# Patient Record
Sex: FEMALE | Race: WHITE | NOT HISPANIC OR LATINO | URBAN - METROPOLITAN AREA
[De-identification: names, ages, dates, MRNs, and addresses within clinical notes are randomized per-mention and may not be internally consistent; named-entity substitution may affect disease eponyms.]

---

## 2018-09-22 ENCOUNTER — INPATIENT (INPATIENT)
Facility: HOSPITAL | Age: 58
LOS: 2 days | Discharge: HOME | End: 2018-09-25
Attending: OBSTETRICS & GYNECOLOGY | Admitting: OBSTETRICS & GYNECOLOGY
Payer: MEDICARE

## 2018-09-22 VITALS
OXYGEN SATURATION: 100 % | DIASTOLIC BLOOD PRESSURE: 76 MMHG | TEMPERATURE: 99 F | HEART RATE: 115 BPM | RESPIRATION RATE: 16 BRPM | SYSTOLIC BLOOD PRESSURE: 134 MMHG

## 2018-09-22 DIAGNOSIS — S31.41XA LACERATION WITHOUT FOREIGN BODY OF VAGINA AND VULVA, INITIAL ENCOUNTER: ICD-10-CM

## 2018-09-22 DIAGNOSIS — Z90.710 ACQUIRED ABSENCE OF BOTH CERVIX AND UTERUS: Chronic | ICD-10-CM

## 2018-09-22 LAB
ALBUMIN SERPL ELPH-MCNC: 3.8 G/DL — SIGNIFICANT CHANGE UP (ref 3.3–5)
ALP SERPL-CCNC: 93 U/L — SIGNIFICANT CHANGE UP (ref 40–120)
ALT FLD-CCNC: 8 U/L — SIGNIFICANT CHANGE UP (ref 4–33)
APTT BLD: 44.1 SEC — HIGH (ref 27.5–37.4)
AST SERPL-CCNC: 18 U/L — SIGNIFICANT CHANGE UP (ref 4–32)
BASE EXCESS BLDV CALC-SCNC: 2.1 MMOL/L — SIGNIFICANT CHANGE UP
BASOPHILS # BLD AUTO: 0.05 K/UL — SIGNIFICANT CHANGE UP (ref 0–0.2)
BASOPHILS NFR BLD AUTO: 0.3 % — SIGNIFICANT CHANGE UP (ref 0–2)
BILIRUB SERPL-MCNC: 0.3 MG/DL — SIGNIFICANT CHANGE UP (ref 0.2–1.2)
BLD GP AB SCN SERPL QL: NEGATIVE — SIGNIFICANT CHANGE UP
BLOOD GAS VENOUS - CREATININE: 0.58 MG/DL — SIGNIFICANT CHANGE UP (ref 0.5–1.3)
BUN SERPL-MCNC: 10 MG/DL — SIGNIFICANT CHANGE UP (ref 7–23)
CALCIUM SERPL-MCNC: 8.9 MG/DL — SIGNIFICANT CHANGE UP (ref 8.4–10.5)
CHLORIDE BLDV-SCNC: 104 MMOL/L — SIGNIFICANT CHANGE UP (ref 96–108)
CHLORIDE SERPL-SCNC: 102 MMOL/L — SIGNIFICANT CHANGE UP (ref 98–107)
CO2 SERPL-SCNC: 23 MMOL/L — SIGNIFICANT CHANGE UP (ref 22–31)
CREAT SERPL-MCNC: 0.69 MG/DL — SIGNIFICANT CHANGE UP (ref 0.5–1.3)
EOSINOPHIL # BLD AUTO: 0 K/UL — SIGNIFICANT CHANGE UP (ref 0–0.5)
EOSINOPHIL NFR BLD AUTO: 0 % — SIGNIFICANT CHANGE UP (ref 0–6)
GAS PNL BLDV: 138 MMOL/L — SIGNIFICANT CHANGE UP (ref 136–146)
GLUCOSE BLDV-MCNC: 107 — HIGH (ref 70–99)
GLUCOSE SERPL-MCNC: 103 MG/DL — HIGH (ref 70–99)
HCO3 BLDV-SCNC: 26 MMOL/L — SIGNIFICANT CHANGE UP (ref 20–27)
HCT VFR BLD CALC: 30.5 % — LOW (ref 34.5–45)
HCT VFR BLD CALC: 34.3 % — LOW (ref 34.5–45)
HCT VFR BLDV CALC: 37.4 % — SIGNIFICANT CHANGE UP (ref 34.5–45)
HGB BLD-MCNC: 10.2 G/DL — LOW (ref 11.5–15.5)
HGB BLD-MCNC: 11.4 G/DL — LOW (ref 11.5–15.5)
HGB BLDV-MCNC: 12.2 G/DL — SIGNIFICANT CHANGE UP (ref 11.5–15.5)
IMM GRANULOCYTES # BLD AUTO: 0.11 # — SIGNIFICANT CHANGE UP
IMM GRANULOCYTES NFR BLD AUTO: 0.7 % — SIGNIFICANT CHANGE UP (ref 0–1.5)
INR BLD: 3.03 — HIGH (ref 0.88–1.17)
LACTATE BLDV-MCNC: 1.2 MMOL/L — SIGNIFICANT CHANGE UP (ref 0.5–2)
LYMPHOCYTES # BLD AUTO: 0.71 K/UL — LOW (ref 1–3.3)
LYMPHOCYTES # BLD AUTO: 4.4 % — LOW (ref 13–44)
MCHC RBC-ENTMCNC: 33.2 % — SIGNIFICANT CHANGE UP (ref 32–36)
MCHC RBC-ENTMCNC: 33.2 PG — SIGNIFICANT CHANGE UP (ref 27–34)
MCHC RBC-ENTMCNC: 33.2 PG — SIGNIFICANT CHANGE UP (ref 27–34)
MCHC RBC-ENTMCNC: 33.4 % — SIGNIFICANT CHANGE UP (ref 32–36)
MCV RBC AUTO: 100 FL — SIGNIFICANT CHANGE UP (ref 80–100)
MCV RBC AUTO: 99.3 FL — SIGNIFICANT CHANGE UP (ref 80–100)
MONOCYTES # BLD AUTO: 0.91 K/UL — HIGH (ref 0–0.9)
MONOCYTES NFR BLD AUTO: 5.6 % — SIGNIFICANT CHANGE UP (ref 2–14)
NEUTROPHILS # BLD AUTO: 14.49 K/UL — HIGH (ref 1.8–7.4)
NEUTROPHILS NFR BLD AUTO: 89 % — HIGH (ref 43–77)
NRBC # FLD: 0 — SIGNIFICANT CHANGE UP
NRBC # FLD: 0 — SIGNIFICANT CHANGE UP
PCO2 BLDV: 43 MMHG — SIGNIFICANT CHANGE UP (ref 41–51)
PH BLDV: 7.41 PH — SIGNIFICANT CHANGE UP (ref 7.32–7.43)
PLATELET # BLD AUTO: 310 K/UL — SIGNIFICANT CHANGE UP (ref 150–400)
PLATELET # BLD AUTO: 320 K/UL — SIGNIFICANT CHANGE UP (ref 150–400)
PMV BLD: 10.5 FL — SIGNIFICANT CHANGE UP (ref 7–13)
PMV BLD: 9.9 FL — SIGNIFICANT CHANGE UP (ref 7–13)
PO2 BLDV: 35 MMHG — SIGNIFICANT CHANGE UP (ref 35–40)
POTASSIUM BLDV-SCNC: 3.2 MMOL/L — LOW (ref 3.4–4.5)
POTASSIUM SERPL-MCNC: 3.4 MMOL/L — LOW (ref 3.5–5.3)
POTASSIUM SERPL-SCNC: 3.4 MMOL/L — LOW (ref 3.5–5.3)
PROT SERPL-MCNC: 6.7 G/DL — SIGNIFICANT CHANGE UP (ref 6–8.3)
PROTHROM AB SERPL-ACNC: 35.7 SEC — HIGH (ref 9.8–13.1)
RBC # BLD: 3.07 M/UL — LOW (ref 3.8–5.2)
RBC # BLD: 3.43 M/UL — LOW (ref 3.8–5.2)
RBC # FLD: 13.3 % — SIGNIFICANT CHANGE UP (ref 10.3–14.5)
RBC # FLD: 13.5 % — SIGNIFICANT CHANGE UP (ref 10.3–14.5)
RH IG SCN BLD-IMP: NEGATIVE — SIGNIFICANT CHANGE UP
SAO2 % BLDV: 65.2 % — SIGNIFICANT CHANGE UP (ref 60–85)
SODIUM SERPL-SCNC: 138 MMOL/L — SIGNIFICANT CHANGE UP (ref 135–145)
WBC # BLD: 16.27 K/UL — HIGH (ref 3.8–10.5)
WBC # BLD: 22.25 K/UL — HIGH (ref 3.8–10.5)
WBC # FLD AUTO: 16.27 K/UL — HIGH (ref 3.8–10.5)
WBC # FLD AUTO: 22.25 K/UL — HIGH (ref 3.8–10.5)

## 2018-09-22 RX ORDER — SODIUM CHLORIDE 9 MG/ML
2000 INJECTION INTRAMUSCULAR; INTRAVENOUS; SUBCUTANEOUS ONCE
Qty: 0 | Refills: 0 | Status: COMPLETED | OUTPATIENT
Start: 2018-09-22 | End: 2018-09-22

## 2018-09-22 RX ORDER — PHYTONADIONE (VIT K1) 5 MG
5 TABLET ORAL ONCE
Qty: 0 | Refills: 0 | Status: COMPLETED | OUTPATIENT
Start: 2018-09-22 | End: 2018-09-22

## 2018-09-22 RX ORDER — TRANEXAMIC ACID 100 MG/ML
1000 INJECTION, SOLUTION INTRAVENOUS ONCE
Qty: 0 | Refills: 0 | Status: COMPLETED | OUTPATIENT
Start: 2018-09-22 | End: 2018-09-22

## 2018-09-22 RX ORDER — SODIUM CHLORIDE 9 MG/ML
1000 INJECTION INTRAMUSCULAR; INTRAVENOUS; SUBCUTANEOUS ONCE
Qty: 0 | Refills: 0 | Status: COMPLETED | OUTPATIENT
Start: 2018-09-22 | End: 2018-09-22

## 2018-09-22 RX ORDER — PROTHROMBIN COMPLEX CONCENTRATE (HUMAN) 25.5; 16.5; 24; 22; 22; 26 [IU]/ML; [IU]/ML; [IU]/ML; [IU]/ML; [IU]/ML; [IU]/ML
1500 POWDER, FOR SOLUTION INTRAVENOUS ONCE
Qty: 0 | Refills: 0 | Status: COMPLETED | OUTPATIENT
Start: 2018-09-22 | End: 2018-09-22

## 2018-09-22 RX ADMIN — SODIUM CHLORIDE 1000 MILLILITER(S): 9 INJECTION INTRAMUSCULAR; INTRAVENOUS; SUBCUTANEOUS at 20:13

## 2018-09-22 RX ADMIN — SODIUM CHLORIDE 1000 MILLILITER(S): 9 INJECTION INTRAMUSCULAR; INTRAVENOUS; SUBCUTANEOUS at 17:30

## 2018-09-22 RX ADMIN — Medication 101 MILLIGRAM(S): at 21:30

## 2018-09-22 RX ADMIN — TRANEXAMIC ACID 220 MILLIGRAM(S): 100 INJECTION, SOLUTION INTRAVENOUS at 18:07

## 2018-09-22 RX ADMIN — PROTHROMBIN COMPLEX CONCENTRATE (HUMAN) 1500 INTERNATIONAL UNIT(S): 25.5; 16.5; 24; 22; 22; 26 POWDER, FOR SOLUTION INTRAVENOUS at 21:29

## 2018-09-22 RX ADMIN — SODIUM CHLORIDE 1000 MILLILITER(S): 9 INJECTION INTRAMUSCULAR; INTRAVENOUS; SUBCUTANEOUS at 23:51

## 2018-09-22 RX ADMIN — PROTHROMBIN COMPLEX CONCENTRATE (HUMAN) 400 INTERNATIONAL UNIT(S): 25.5; 16.5; 24; 22; 22; 26 POWDER, FOR SOLUTION INTRAVENOUS at 21:11

## 2018-09-22 RX ADMIN — TRANEXAMIC ACID 1000 MILLIGRAM(S): 100 INJECTION, SOLUTION INTRAVENOUS at 20:13

## 2018-09-22 NOTE — CONSULT NOTE ADULT - PROBLEM SELECTOR RECOMMENDATION 9
-fibrillar placed. Vaginal packing x1 placed.   -pt will need to demonstrate ability to void prior to d/c  -pt to return to the ED in 2 days to have packing removed  -Anticoagulation per ED care    Pt seen with Dr. Julius Smith, pgy2

## 2018-09-22 NOTE — ED PROVIDER NOTE - OBJECTIVE STATEMENT
58 year female with SLE, anti-phospholipid syndrome, Factor 5 Leiden, on coumadin, h/o oopherectomy/hysterectomy, presenting with 3 hour h/o vaginal bleeding. Patient was having sex with sexual partner today when she began to bleed heavy with clots. This was her first time having sexual intercourse in 10 years. Patient soaked through 3 or 4 pads at home then another 6 pads at the ED. First occurrence of symptoms. Denies any dyspaurenia, LOC, stool changes, hematuria, leg swelling, SOB, or CP. Has travel hx flying to [place] from [place].    For SLE on MTX, plaquinal, prednisone, azothiprine

## 2018-09-22 NOTE — ED ADULT NURSE NOTE - NSIMPLEMENTINTERV_GEN_ALL_ED
Implemented All Fall with Harm Risk Interventions:  Adrian to call system. Call bell, personal items and telephone within reach. Instruct patient to call for assistance. Room bathroom lighting operational. Non-slip footwear when patient is off stretcher. Physically safe environment: no spills, clutter or unnecessary equipment. Stretcher in lowest position, wheels locked, appropriate side rails in place. Provide visual cue, wrist band, yellow gown, etc. Monitor gait and stability. Monitor for mental status changes and reorient to person, place, and time. Review medications for side effects contributing to fall risk. Reinforce activity limits and safety measures with patient and family. Provide visual clues: red socks.

## 2018-09-22 NOTE — CONSULT NOTE ADULT - SUBJECTIVE AND OBJECTIVE BOX
R2 GYN ED CONSULT NOTE    SUBJECTIVE:    57yo  s/p TLH BS () pmh significant SLE, APLS on coumadin with vaginal bleeding after intercourse. Pt states that she had not had intercourse for many years. Today she started to have vaginal bleeding during intercourse.     She states that this was not a new partner. She declined STD screening. She states that it was voluntary intercourse.     Pt denies fever, chills, nausea, vomiting, diarrhea, headache, constipation, dizzyness, syncope, chest pain, palpitations, shortness of breath, dysuria, urgency, frequency. No abd vaginal discharge.     Pt is from [place] and is visiting a friend.       Primary OB/GYN:  OBH: c/s x2   GYNH: denies hx of fibroids, ov cysts, abnl paps, sti she had a TL Bilateral Salpingectomy for menorrhagia. ()   PMSH: s/p c/s x2, s/p lap cholecystectomy s/p ESTEPHANIE, BS   MEDS: Coumadin, mtx (monthly injections)   ALL: pencillin (rash)   SOCH: denies t/e/d; safe at home  FAMH: denies fam hx of breast/uterine/ovarian/colon cancer  ROS: negative except per hpi    OBJECTIVE:    VITAL SIGNS:  Vital Signs Last 24 Hrs  T(C): 37.3 (22 Sep 2018 19:51), Max: 37.3 (22 Sep 2018 19:51)  T(F): 99.1 (22 Sep 2018 19:51), Max: 99.1 (22 Sep 2018 19:51)  HR: 95 (22 Sep 2018 21:46) (92 - 115)  BP: 156/40 (22 Sep 2018 21:46) (108/50 - 160/91)  BP(mean): --  RR: 20 (22 Sep 2018 21:46) (16 - 20)  SpO2: 98% (22 Sep 2018 21:46) (98% - 100%)    CAPILLARY BLOOD GLUCOSE      PHYSICAL EXAM:  GEN: NAD, A&Ox3  CV: RRR, no m/g/r  LUNGS: CTAB  ABD: soft, NT, ND, +BS  SPECULUM: intact vaginal cuff. 3cm laceration in the posterior vagina, right side. Clots in vaginal vault. Slow bleeding from laceration.   EXT: WWP, no edema/TTP    LABS:                        10.2   22.25 )-----------( 310      ( 22 Sep 2018 20:30 )             30.5   baso x      eos x      imm gran x      lymph x      mono x      poly x                            11.4   16.27 )-----------( 320      ( 22 Sep 2018 16:50 )             34.3   baso 0.3    eos 0.0    imm gran 0.7    lymph 4.4    mono 5.6    poly 89.0           138  |  102  |  10  ----------------------------<  103<H>  3.4<L>   |  23  |  0.69    Ca    8.9      22 Sep 2018 16:50    TPro  6.7  /  Alb  3.8  /  TBili  0.3  /  DBili  x   /  AST  18  /  ALT  8   /  AlkPhos  93

## 2018-09-22 NOTE — ED ADULT TRIAGE NOTE - CHIEF COMPLAINT QUOTE
Pt c/o vag spotting w clots x this morning during sexual intercourse/denies abd pain, hysterectomy in 1985, on coumadin, hx of lupus. Pt c/o vag spotting w clots x this morning during sexual intercourse/denies abd pain, hysterectomy in 1985, on coumadin for factor 5 leiden, hx of lupus. Pt c/o vag bleeding w clots x this morning during sexual intercourse/denies abd pain, hysterectomy in 1985, on coumadin for factor 5 leiden, hx of lupus.

## 2018-09-22 NOTE — ED ADULT NURSE NOTE - CHIEF COMPLAINT QUOTE
Pt c/o vag bleeding w clots x this morning during sexual intercourse/denies abd pain, hysterectomy in 1985, on coumadin for factor 5 leiden, hx of lupus.

## 2018-09-22 NOTE — ED PROVIDER NOTE - MEDICAL DECISION MAKING DETAILS
John:  58 F, lupus, APL, Factor 5 Leiden (Coumadin), h/o hysterectomy, prednisone, p/w VB after intercourse (blood and clots). No dyspareunia. PE: Removed much clot and blood. No vaginal wall lac/polyp/tumor. Bleeding seems to be coming from deep anterior/superior wall, just to the upper left of where the cervix used to be (small trickle of blood there, adherent clot). Give TXA. Check Hb and INR.

## 2018-09-22 NOTE — ED ADULT NURSE NOTE - OBJECTIVE STATEMENT
patient alert ox3 came in c/o heavy vaginal bleeding started around 1pm today while having intercourse, states she is bleeding profusely with clots. h/o total hysterectomy in the past and is on coumadin. CM shows sinus tach. Md by bed side evaluating. labs done as ordered. awaiting results and re eval.

## 2018-09-22 NOTE — ED PROVIDER NOTE - ATTENDING CONTRIBUTION TO CARE
I performed a face-to-face evaluation of the patient and performed a history and physical examination. I agree with the history and physical examination.    John:  58 F, lupus, APL, Factor 5 Leiden (Coumadin), h/o hysterectomy, prednisone, p/w VB after intercourse (blood and clots). Doesn't have atrophic vaginitis. Hadn't had sex for 10 years. No dyspareunia. PE: Removed much clot and blood. No vaginal wall lac/polyp/tumor. Bleeding seems to be coming from deep anterior/superior wall, just to the upper left of where the cervix used to be (small trickle of blood there, adherent clot). Give TXA. Check Hb and INR.

## 2018-09-22 NOTE — ED PROVIDER NOTE - CHIEF COMPLAINT
The patient is a 58y Female complaining of The patient is a 58y Female complaining of vaginal bleeding

## 2018-09-23 DIAGNOSIS — N93.9 ABNORMAL UTERINE AND VAGINAL BLEEDING, UNSPECIFIED: ICD-10-CM

## 2018-09-23 DIAGNOSIS — Z90.49 ACQUIRED ABSENCE OF OTHER SPECIFIED PARTS OF DIGESTIVE TRACT: Chronic | ICD-10-CM

## 2018-09-23 LAB
ALBUMIN SERPL ELPH-MCNC: 3.4 G/DL — SIGNIFICANT CHANGE UP (ref 3.3–5)
ALP SERPL-CCNC: 74 U/L — SIGNIFICANT CHANGE UP (ref 40–120)
ALT FLD-CCNC: 8 U/L — SIGNIFICANT CHANGE UP (ref 4–33)
APTT BLD: 26.8 SEC — LOW (ref 27.5–37.4)
AST SERPL-CCNC: 13 U/L — SIGNIFICANT CHANGE UP (ref 4–32)
BASOPHILS # BLD AUTO: 0.03 K/UL — SIGNIFICANT CHANGE UP (ref 0–0.2)
BASOPHILS # BLD AUTO: 0.03 K/UL — SIGNIFICANT CHANGE UP (ref 0–0.2)
BASOPHILS NFR BLD AUTO: 0.2 % — SIGNIFICANT CHANGE UP (ref 0–2)
BASOPHILS NFR BLD AUTO: 0.2 % — SIGNIFICANT CHANGE UP (ref 0–2)
BILIRUB SERPL-MCNC: 0.5 MG/DL — SIGNIFICANT CHANGE UP (ref 0.2–1.2)
BUN SERPL-MCNC: 11 MG/DL — SIGNIFICANT CHANGE UP (ref 7–23)
CALCIUM SERPL-MCNC: 8.2 MG/DL — LOW (ref 8.4–10.5)
CHLORIDE SERPL-SCNC: 99 MMOL/L — SIGNIFICANT CHANGE UP (ref 98–107)
CO2 SERPL-SCNC: 22 MMOL/L — SIGNIFICANT CHANGE UP (ref 22–31)
CREAT SERPL-MCNC: 0.7 MG/DL — SIGNIFICANT CHANGE UP (ref 0.5–1.3)
EOSINOPHIL # BLD AUTO: 0 K/UL — SIGNIFICANT CHANGE UP (ref 0–0.5)
EOSINOPHIL # BLD AUTO: 0.01 K/UL — SIGNIFICANT CHANGE UP (ref 0–0.5)
EOSINOPHIL NFR BLD AUTO: 0 % — SIGNIFICANT CHANGE UP (ref 0–6)
EOSINOPHIL NFR BLD AUTO: 0.1 % — SIGNIFICANT CHANGE UP (ref 0–6)
GLUCOSE SERPL-MCNC: 149 MG/DL — HIGH (ref 70–99)
HCT VFR BLD CALC: 20.7 % — CRITICAL LOW (ref 34.5–45)
HCT VFR BLD CALC: 23.1 % — LOW (ref 34.5–45)
HCT VFR BLD CALC: 26.6 % — LOW (ref 34.5–45)
HCT VFR BLD CALC: 29.3 % — LOW (ref 34.5–45)
HGB BLD-MCNC: 6.9 G/DL — CRITICAL LOW (ref 11.5–15.5)
HGB BLD-MCNC: 7.8 G/DL — LOW (ref 11.5–15.5)
HGB BLD-MCNC: 8.7 G/DL — LOW (ref 11.5–15.5)
HGB BLD-MCNC: 9.8 G/DL — LOW (ref 11.5–15.5)
IMM GRANULOCYTES # BLD AUTO: 0.1 # — SIGNIFICANT CHANGE UP
IMM GRANULOCYTES # BLD AUTO: 0.14 # — SIGNIFICANT CHANGE UP
IMM GRANULOCYTES NFR BLD AUTO: 0.6 % — SIGNIFICANT CHANGE UP (ref 0–1.5)
IMM GRANULOCYTES NFR BLD AUTO: 1 % — SIGNIFICANT CHANGE UP (ref 0–1.5)
INR BLD: 1 — SIGNIFICANT CHANGE UP (ref 0.88–1.17)
INR BLD: 1.19 — HIGH (ref 0.88–1.17)
INR BLD: 1.3 — HIGH (ref 0.88–1.17)
LACTATE SERPL-SCNC: 2 MMOL/L — SIGNIFICANT CHANGE UP (ref 0.5–2)
LYMPHOCYTES # BLD AUTO: 1.67 K/UL — SIGNIFICANT CHANGE UP (ref 1–3.3)
LYMPHOCYTES # BLD AUTO: 10.7 % — LOW (ref 13–44)
LYMPHOCYTES # BLD AUTO: 2.8 K/UL — SIGNIFICANT CHANGE UP (ref 1–3.3)
LYMPHOCYTES # BLD AUTO: 20.6 % — SIGNIFICANT CHANGE UP (ref 13–44)
MCHC RBC-ENTMCNC: 31.8 PG — SIGNIFICANT CHANGE UP (ref 27–34)
MCHC RBC-ENTMCNC: 32.7 % — SIGNIFICANT CHANGE UP (ref 32–36)
MCHC RBC-ENTMCNC: 32.8 PG — SIGNIFICANT CHANGE UP (ref 27–34)
MCHC RBC-ENTMCNC: 33.3 % — SIGNIFICANT CHANGE UP (ref 32–36)
MCHC RBC-ENTMCNC: 33.4 % — SIGNIFICANT CHANGE UP (ref 32–36)
MCHC RBC-ENTMCNC: 33.8 % — SIGNIFICANT CHANGE UP (ref 32–36)
MCHC RBC-ENTMCNC: 34 PG — SIGNIFICANT CHANGE UP (ref 27–34)
MCHC RBC-ENTMCNC: 34.8 PG — HIGH (ref 27–34)
MCV RBC AUTO: 100.4 FL — HIGH (ref 80–100)
MCV RBC AUTO: 102 FL — HIGH (ref 80–100)
MCV RBC AUTO: 103.1 FL — HIGH (ref 80–100)
MCV RBC AUTO: 95.1 FL — SIGNIFICANT CHANGE UP (ref 80–100)
MONOCYTES # BLD AUTO: 1.1 K/UL — HIGH (ref 0–0.9)
MONOCYTES # BLD AUTO: 1.13 K/UL — HIGH (ref 0–0.9)
MONOCYTES NFR BLD AUTO: 7 % — SIGNIFICANT CHANGE UP (ref 2–14)
MONOCYTES NFR BLD AUTO: 8.3 % — SIGNIFICANT CHANGE UP (ref 2–14)
NEUTROPHILS # BLD AUTO: 12.74 K/UL — HIGH (ref 1.8–7.4)
NEUTROPHILS # BLD AUTO: 9.46 K/UL — HIGH (ref 1.8–7.4)
NEUTROPHILS NFR BLD AUTO: 69.8 % — SIGNIFICANT CHANGE UP (ref 43–77)
NEUTROPHILS NFR BLD AUTO: 81.5 % — HIGH (ref 43–77)
NRBC # FLD: 0 — SIGNIFICANT CHANGE UP
PLATELET # BLD AUTO: 213 K/UL — SIGNIFICANT CHANGE UP (ref 150–400)
PLATELET # BLD AUTO: 228 K/UL — SIGNIFICANT CHANGE UP (ref 150–400)
PLATELET # BLD AUTO: 243 K/UL — SIGNIFICANT CHANGE UP (ref 150–400)
PLATELET # BLD AUTO: 267 K/UL — SIGNIFICANT CHANGE UP (ref 150–400)
PMV BLD: 10.2 FL — SIGNIFICANT CHANGE UP (ref 7–13)
PMV BLD: 10.3 FL — SIGNIFICANT CHANGE UP (ref 7–13)
PMV BLD: 10.9 FL — SIGNIFICANT CHANGE UP (ref 7–13)
PMV BLD: 9.9 FL — SIGNIFICANT CHANGE UP (ref 7–13)
POTASSIUM SERPL-MCNC: 3.3 MMOL/L — LOW (ref 3.5–5.3)
POTASSIUM SERPL-SCNC: 3.3 MMOL/L — LOW (ref 3.5–5.3)
PROT SERPL-MCNC: 5.6 G/DL — LOW (ref 6–8.3)
PROTHROM AB SERPL-ACNC: 11.5 SEC — SIGNIFICANT CHANGE UP (ref 9.8–13.1)
PROTHROM AB SERPL-ACNC: 13.7 SEC — HIGH (ref 9.8–13.1)
PROTHROM AB SERPL-ACNC: 14.5 SEC — HIGH (ref 9.8–13.1)
RBC # BLD: 2.03 M/UL — LOW (ref 3.8–5.2)
RBC # BLD: 2.24 M/UL — LOW (ref 3.8–5.2)
RBC # BLD: 2.65 M/UL — LOW (ref 3.8–5.2)
RBC # BLD: 3.08 M/UL — LOW (ref 3.8–5.2)
RBC # FLD: 13.3 % — SIGNIFICANT CHANGE UP (ref 10.3–14.5)
RBC # FLD: 13.3 % — SIGNIFICANT CHANGE UP (ref 10.3–14.5)
RBC # FLD: 13.4 % — SIGNIFICANT CHANGE UP (ref 10.3–14.5)
RBC # FLD: 16.4 % — HIGH (ref 10.3–14.5)
RH IG SCN BLD-IMP: NEGATIVE — SIGNIFICANT CHANGE UP
SODIUM SERPL-SCNC: 134 MMOL/L — LOW (ref 135–145)
WBC # BLD: 12.81 K/UL — HIGH (ref 3.8–10.5)
WBC # BLD: 13.57 K/UL — HIGH (ref 3.8–10.5)
WBC # BLD: 15.64 K/UL — HIGH (ref 3.8–10.5)
WBC # BLD: 17.2 K/UL — HIGH (ref 3.8–10.5)
WBC # FLD AUTO: 12.81 K/UL — HIGH (ref 3.8–10.5)
WBC # FLD AUTO: 13.57 K/UL — HIGH (ref 3.8–10.5)
WBC # FLD AUTO: 15.64 K/UL — HIGH (ref 3.8–10.5)
WBC # FLD AUTO: 17.2 K/UL — HIGH (ref 3.8–10.5)

## 2018-09-23 PROCEDURE — 57200 REPAIR OF VAGINA: CPT | Mod: GC

## 2018-09-23 RX ORDER — SODIUM CHLORIDE 9 MG/ML
1000 INJECTION, SOLUTION INTRAVENOUS
Qty: 0 | Refills: 0 | Status: DISCONTINUED | OUTPATIENT
Start: 2018-09-23 | End: 2018-09-23

## 2018-09-23 RX ORDER — HEPARIN SODIUM 5000 [USP'U]/ML
INJECTION INTRAVENOUS; SUBCUTANEOUS
Qty: 25000 | Refills: 0 | Status: DISCONTINUED | OUTPATIENT
Start: 2018-09-23 | End: 2018-09-24

## 2018-09-23 RX ORDER — ONDANSETRON 8 MG/1
4 TABLET, FILM COATED ORAL ONCE
Qty: 0 | Refills: 0 | Status: DISCONTINUED | OUTPATIENT
Start: 2018-09-23 | End: 2018-09-23

## 2018-09-23 RX ORDER — LEVETIRACETAM 250 MG/1
750 TABLET, FILM COATED ORAL EVERY 12 HOURS
Qty: 0 | Refills: 0 | Status: DISCONTINUED | OUTPATIENT
Start: 2018-09-23 | End: 2018-09-23

## 2018-09-23 RX ORDER — AZATHIOPRINE 100 MG/1
50 TABLET ORAL DAILY
Qty: 0 | Refills: 0 | Status: DISCONTINUED | OUTPATIENT
Start: 2018-09-23 | End: 2018-09-25

## 2018-09-23 RX ORDER — HEPARIN SODIUM 5000 [USP'U]/ML
5500 INJECTION INTRAVENOUS; SUBCUTANEOUS EVERY 6 HOURS
Qty: 0 | Refills: 0 | Status: DISCONTINUED | OUTPATIENT
Start: 2018-09-23 | End: 2018-09-24

## 2018-09-23 RX ORDER — HEPARIN SODIUM 5000 [USP'U]/ML
2500 INJECTION INTRAVENOUS; SUBCUTANEOUS EVERY 6 HOURS
Qty: 0 | Refills: 0 | Status: DISCONTINUED | OUTPATIENT
Start: 2018-09-23 | End: 2018-09-24

## 2018-09-23 RX ORDER — FENTANYL CITRATE 50 UG/ML
25 INJECTION INTRAVENOUS
Qty: 0 | Refills: 0 | Status: DISCONTINUED | OUTPATIENT
Start: 2018-09-23 | End: 2018-09-23

## 2018-09-23 RX ORDER — IBUPROFEN 200 MG
600 TABLET ORAL EVERY 6 HOURS
Qty: 0 | Refills: 0 | Status: DISCONTINUED | OUTPATIENT
Start: 2018-09-23 | End: 2018-09-25

## 2018-09-23 RX ORDER — LEVETIRACETAM 250 MG/1
750 TABLET, FILM COATED ORAL
Qty: 0 | Refills: 0 | Status: DISCONTINUED | OUTPATIENT
Start: 2018-09-23 | End: 2018-09-25

## 2018-09-23 RX ORDER — ACETAMINOPHEN 500 MG
975 TABLET ORAL EVERY 6 HOURS
Qty: 0 | Refills: 0 | Status: DISCONTINUED | OUTPATIENT
Start: 2018-09-23 | End: 2018-09-25

## 2018-09-23 RX ORDER — FERROUS SULFATE 325(65) MG
325 TABLET ORAL DAILY
Qty: 0 | Refills: 0 | Status: DISCONTINUED | OUTPATIENT
Start: 2018-09-23 | End: 2018-09-25

## 2018-09-23 RX ORDER — VENLAFAXINE HCL 75 MG
75 CAPSULE, EXT RELEASE 24 HR ORAL EVERY 8 HOURS
Qty: 0 | Refills: 0 | Status: DISCONTINUED | OUTPATIENT
Start: 2018-09-23 | End: 2018-09-25

## 2018-09-23 RX ORDER — FOLIC ACID 0.8 MG
1 TABLET ORAL DAILY
Qty: 0 | Refills: 0 | Status: DISCONTINUED | OUTPATIENT
Start: 2018-09-23 | End: 2018-09-25

## 2018-09-23 RX ORDER — HYDROXYCHLOROQUINE SULFATE 200 MG
200 TABLET ORAL DAILY
Qty: 0 | Refills: 0 | Status: DISCONTINUED | OUTPATIENT
Start: 2018-09-23 | End: 2018-09-25

## 2018-09-23 RX ORDER — SODIUM CHLORIDE 9 MG/ML
1000 INJECTION, SOLUTION INTRAVENOUS
Qty: 0 | Refills: 0 | Status: DISCONTINUED | OUTPATIENT
Start: 2018-09-23 | End: 2018-09-25

## 2018-09-23 RX ORDER — OXYCODONE HYDROCHLORIDE 5 MG/1
5 TABLET ORAL ONCE
Qty: 0 | Refills: 0 | Status: DISCONTINUED | OUTPATIENT
Start: 2018-09-23 | End: 2018-09-23

## 2018-09-23 RX ORDER — PANTOPRAZOLE SODIUM 20 MG/1
40 TABLET, DELAYED RELEASE ORAL
Qty: 0 | Refills: 0 | Status: DISCONTINUED | OUTPATIENT
Start: 2018-09-23 | End: 2018-09-25

## 2018-09-23 RX ORDER — FLUTICASONE PROPIONATE 50 MCG
1 SPRAY, SUSPENSION NASAL
Qty: 0 | Refills: 0 | Status: DISCONTINUED | OUTPATIENT
Start: 2018-09-23 | End: 2018-09-25

## 2018-09-23 RX ADMIN — HEPARIN SODIUM 1200 UNIT(S)/HR: 5000 INJECTION INTRAVENOUS; SUBCUTANEOUS at 21:07

## 2018-09-23 RX ADMIN — Medication 1 SPRAY(S): at 18:09

## 2018-09-23 RX ADMIN — PANTOPRAZOLE SODIUM 40 MILLIGRAM(S): 20 TABLET, DELAYED RELEASE ORAL at 18:08

## 2018-09-23 RX ADMIN — Medication 1 MILLIGRAM(S): at 18:08

## 2018-09-23 RX ADMIN — Medication 325 MILLIGRAM(S): at 18:08

## 2018-09-23 RX ADMIN — LEVETIRACETAM 750 MILLIGRAM(S): 250 TABLET, FILM COATED ORAL at 23:04

## 2018-09-23 RX ADMIN — SODIUM CHLORIDE 125 MILLILITER(S): 9 INJECTION, SOLUTION INTRAVENOUS at 23:23

## 2018-09-23 RX ADMIN — SODIUM CHLORIDE 125 MILLILITER(S): 9 INJECTION, SOLUTION INTRAVENOUS at 10:13

## 2018-09-23 RX ADMIN — Medication 75 MILLIGRAM(S): at 18:07

## 2018-09-23 RX ADMIN — Medication 975 MILLIGRAM(S): at 08:45

## 2018-09-23 RX ADMIN — Medication 200 MILLIGRAM(S): at 18:07

## 2018-09-23 RX ADMIN — SODIUM CHLORIDE 75 MILLILITER(S): 9 INJECTION, SOLUTION INTRAVENOUS at 03:26

## 2018-09-23 RX ADMIN — Medication 975 MILLIGRAM(S): at 09:15

## 2018-09-23 NOTE — DISCHARGE NOTE ADULT - PLAN OF CARE
Recovery Regular diet as tolerated, regular activity as tolerated, no heavy lifting for first two weeks.  Nothing per vagina: no intercourse, tampons or douching for 6 weeks.  Call your provider if you experience fevers, chills, worsening abdominal pain, inability to urinate or worsening vaginal bleeding.  Follow up with your provider in 2 weeks.

## 2018-09-23 NOTE — H&P ADULT - PROBLEM SELECTOR PLAN 1
- please admit to Dr. Madrid  -Pt to be added to OR for urgent  repair of vaginal laceration   -pre-op labs including CBC  -2upRBC on hold  -Fluid resuscitation  D/w Dr. Julius Smith, pgy2

## 2018-09-23 NOTE — DISCHARGE NOTE ADULT - PATIENT PORTAL LINK FT
You can access the [facility] Portal, offered by [facility] [place], by registering with the following website: [email or web address]

## 2018-09-23 NOTE — DISCHARGE NOTE ADULT - INSTRUCTIONS
monitor for s/s of infection, monitor for s/s of infection, pus/drainage coming from incision site, pain unrelieved with pain medication, come to Emergency room and call physician.

## 2018-09-23 NOTE — DISCHARGE NOTE ADULT - CARE PROVIDER_API CALL
JOSÉ GYN,   [facility]  ambulatory care unit, 3rd floor  [phone]  Phone: (   )    -  Fax: (   )    -

## 2018-09-23 NOTE — H&P ADULT - NSHPLABSRESULTS_GEN_ALL_CORE
8.7                  x    | x    | x            x     >-----------< 267     ------------------------< x                     26.6                 x    | x    | x                                            Ca x     Mg x     Ph x
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
## 2018-09-23 NOTE — PROGRESS NOTE ADULT - ASSESSMENT
Vaginal laceration s/p repair with probable higher EBL than calculated, no active bleeding suspected  Will transfuse 2units PRBC and reevaluation  Anticipate D/c home after blood transfusions/reevaluation

## 2018-09-23 NOTE — PROGRESS NOTE ADULT - SUBJECTIVE AND OBJECTIVE BOX
OB Attg--late entry    Pt feels tired and weak when she's ambulating.  Denies VB, pain, CP, SOB.  Vital Signs Last 24 Hrs  T(C): 36.8 (23 Sep 2018 12:50), Max: 37.4 (23 Sep 2018 06:00)  T(F): 98.3 (23 Sep 2018 12:50), Max: 99.3 (23 Sep 2018 06:00)  HR: 91 (23 Sep 2018 12:50) (83 - 115)  BP: 124/48 (23 Sep 2018 12:50) (93/55 - 160/91)  BP(mean): 58 (23 Sep 2018 09:00) (58 - 58)  RR: 18 (23 Sep 2018 09:55) (16 - 23)  SpO2: 98% (23 Sep 2018 09:55) (95% - 100%)    Abd--nontender  Pelvic--no active bleeding, surgicel was felt in vagina--minimally stained  Rectovaginal--intact, no hematoma felt                          6.9    13.57 )-----------( 228      ( 23 Sep 2018 06:40 )             20.7

## 2018-09-23 NOTE — BRIEF OPERATIVE NOTE - OPERATION/FINDINGS
3cm right posterior vaginal laceration. Excellent hemostasis after suture. 3cm right apical vaginal laceration closed with 3-0 Monocryl - running locked stitch. Excellent hemostasis after suture.

## 2018-09-23 NOTE — DISCHARGE NOTE ADULT - HOSPITAL COURSE
Patient presented to the ED with vaginal bleeding from a vaginal laceration. She received K centra for reversal as she was on coumadin. She was taken to the OR for repair of vaginal laceration when pt became hypotensive in the ED. She had an uncomplicated surgery.   Please see operative note for details.  During postoperative course patient's vitals were stable, vaginal bleeding appropriate, and pain well controlled.  Post operation day one hematocrit was 23.1.  On day of discharge patient was ambulating, her pain controlled with oral medications, had adequate oral intake, and was voiding freely.  Discharge instructions and precautions were given.  Pt is visiting and is out of town. She states that she will come to the ED in a couple days for INR management. Alternatively she was given the clinic number to follow up in 2 weeks for a postoperative check. Patient presented to the ED with vaginal bleeding from a vaginal laceration. She received K centra for reversal as she was on coumadin. She was taken to the OR for repair of vaginal laceration when pt became hypotensive in the ED. She had an uncomplicated surgery.   Please see operative note for details.  During postoperative course patient's vitals were stable, vaginal bleeding appropriate, and pain well controlled.  Post operation day one hematocrit was 23.1.  On day of discharge patient was ambulating, her pain controlled with oral medications, had adequate oral intake, and was voiding freely.  Discharge instructions and precautions were given.  Pt is visiting and is out of town. Pt will follow with hematology for INR management. Alternatively she was given the clinic number to follow up in 2 weeks for a postoperative check. Patient presented to the ED with vaginal bleeding from a vaginal laceration. She received K centra for reversal as she was on coumadin. She was taken to the OR for repair of vaginal laceration when pt became hypotensive in the ED. She had an uncomplicated surgery.   Please see operative note for details.  During postoperative course patient's vitals were stable, vaginal bleeding appropriate, and pain well controlled.  Post operation day one hematocrit was 23.1.  On day of discharge patient was ambulating, her pain controlled with oral medications, had adequate oral intake, and was voiding freely.  Discharge instructions and precautions were given. Pt will follow with hematology for INR management. Patient has been given the clinic number to follow up in 2 weeks for a postoperative check as she is from out of town and has not yet established care. Patient presented to the ED with vaginal bleeding from a vaginal laceration. She received K centra for reversal as she was on coumadin. She was taken to the OR for repair of vaginal laceration when pt became hypotensive in the ED. She had an uncomplicated surgery.   Please see operative note for details.  During postoperative course patient's vitals were stable, vaginal bleeding appropriate, and pain well controlled.  Post operation day one hematocrit was 23.1.  On day of discharge patient was ambulating, her pain controlled with oral medications, had adequate oral intake, and was voiding freely.  Discharge instructions and precautions were given. Pt will follow with PMD for INR management and is being discharged on warfarin and Lovenox for bridging. Patient has been given the clinic number to follow up in 2 weeks for a postoperative check as she is from out of town and has not yet established care.

## 2018-09-23 NOTE — H&P ADULT - ASSESSMENT
57yo  s/p TLH BS () pmh significant SLE, APLS on coumadin with vaginal bleeding after intercourse. VB 2/2  3cm vaginal laceration. Intact vaginal cuff. Pt became hypotensive 80-90/40s after vaginal packing placement and soaked through packing after one hour.

## 2018-09-23 NOTE — DISCHARGE NOTE ADULT - PROVIDER TOKENS
FREE:[LAST:[[facility]],PHONE:[(   )    -],FAX:[(   )    -],ADDRESS:[[facility]  ambulatory care unit, 3rd floor  [phone]]]

## 2018-09-23 NOTE — DISCHARGE NOTE ADULT - CARE PLAN
Principal Discharge DX:	Vaginal laceration  Goal:	Recovery  Assessment and plan of treatment:	Regular diet as tolerated, regular activity as tolerated, no heavy lifting for first two weeks.  Nothing per vagina: no intercourse, tampons or douching for 6 weeks.  Call your provider if you experience fevers, chills, worsening abdominal pain, inability to urinate or worsening vaginal bleeding.  Follow up with your provider in 2 weeks.

## 2018-09-23 NOTE — DISCHARGE NOTE ADULT - MEDICATION SUMMARY - MEDICATIONS TO TAKE
I will START or STAY ON the medications listed below when I get home from the hospital:  None I will START or STAY ON the medications listed below when I get home from the hospital:    predniSONE 1 mg oral tablet  -- 1 tab(s) by mouth once a day  -- Indication: For Home med    warfarin 5 mg oral tablet  -- 1 tab(s) by mouth once a day  -- Indication: For Blood thinner    enoxaparin 100 mg/mL injectable solution  -- 100 milligram(s) subcutaneously once a day   -- It is very important that you take or use this exactly as directed.  Do not skip doses or discontinue unless directed by your doctor.    -- Indication: For Blood thinner to bridge to coumadin    levETIRAcetam 750 mg oral tablet  -- 1 tab(s) by mouth 2 times a day  -- Indication: For Home med    venlafaxine 75 mg oral tablet  -- 1 tab(s) by mouth every 8 hours  -- Indication: For Home med    hydroxychloroquine 200 mg oral tablet  -- 1 tab(s) by mouth once a day  -- Indication: For Home med    azaTHIOprine 50 mg oral tablet  -- 1 tab(s) by mouth once a day  -- Indication: For Home med    fluticasone 50 mcg/inh nasal spray  -- 1 spray(s) into nose 2 times a day  -- Indication: For Home med    pantoprazole 40 mg oral delayed release tablet  -- 1 tab(s) by mouth once a day (before a meal)  -- Indication: For Home med    folic acid 1 mg oral tablet  -- 1 tab(s) by mouth once a day  -- Indication: For Home med I will START or STAY ON the medications listed below when I get home from the hospital:    predniSONE 1 mg oral tablet  -- 1 tab(s) by mouth once a day  -- Indication: For Home med    warfarin 5 mg oral tablet  -- 1 tab(s) by mouth once a day  -- Indication: For Blood thinner    enoxaparin 80 mg/0.8 mL injectable solution  -- 70 milligram(s) subcutaneously 2 times a day  -- It is very important that you take or use this exactly as directed.  Do not skip doses or discontinue unless directed by your doctor.    -- Indication: For Blood thinner to bridge to coumadin    levETIRAcetam 750 mg oral tablet  -- 1 tab(s) by mouth 2 times a day  -- Indication: For Home med    venlafaxine 75 mg oral tablet  -- 1 tab(s) by mouth every 8 hours  -- Indication: For Home med    hydroxychloroquine 200 mg oral tablet  -- 1 tab(s) by mouth once a day  -- Indication: For Home med    azaTHIOprine 50 mg oral tablet  -- 1 tab(s) by mouth once a day  -- Indication: For Home med    fluticasone 50 mcg/inh nasal spray  -- 1 spray(s) into nose 2 times a day  -- Indication: For Home med    pantoprazole 40 mg oral delayed release tablet  -- 1 tab(s) by mouth once a day (before a meal)  -- Indication: For Home med    folic acid 1 mg oral tablet  -- 1 tab(s) by mouth once a day  -- Indication: For Home med I will START or STAY ON the medications listed below when I get home from the hospital:    predniSONE 1 mg oral tablet  -- 1 tab(s) by mouth once a day  -- Indication: For Home med    enoxaparin 80 mg/0.8 mL injectable solution  -- 70 milligram(s) subcutaneously 2 times a day  -- It is very important that you take or use this exactly as directed.  Do not skip doses or discontinue unless directed by your doctor.    -- Indication: For Blood thinner to bridge to coumadin    warfarin 7.5 mg oral tablet  -- 1 tab(s) by mouth once a day  -- Indication: For Blood thinner    levETIRAcetam 750 mg oral tablet  -- 1 tab(s) by mouth 2 times a day  -- Indication: For Home med    venlafaxine 75 mg oral tablet  -- 1 tab(s) by mouth every 8 hours  -- Indication: For Home med    hydroxychloroquine 200 mg oral tablet  -- 1 tab(s) by mouth once a day  -- Indication: For Home med    azaTHIOprine 50 mg oral tablet  -- 1 tab(s) by mouth once a day  -- Indication: For Home med    fluticasone 50 mcg/inh nasal spray  -- 1 spray(s) into nose 2 times a day  -- Indication: For Home med    pantoprazole 40 mg oral delayed release tablet  -- 1 tab(s) by mouth once a day (before a meal)  -- Indication: For Home med    folic acid 1 mg oral tablet  -- 1 tab(s) by mouth once a day  -- Indication: For Home med I will START or STAY ON the medications listed below when I get home from the hospital:    predniSONE 1 mg oral tablet  -- 1 tab(s) by mouth once a day  -- Indication: For Home med    warfarin 7.5 mg oral tablet  -- 1 tab(s) by mouth once a day  -- Indication: For Blood thinner    enoxaparin 80 mg/0.8 mL injectable solution  -- 70 milligram(s) subcutaneously 2 times a day  -- It is very important that you take or use this exactly as directed.  Do not skip doses or discontinue unless directed by your doctor.    -- Indication: For Blood thinner to bridge to coumadin    levETIRAcetam 750 mg oral tablet  -- 1 tab(s) by mouth 2 times a day  -- Indication: For Home med    venlafaxine 75 mg oral tablet  -- 1 tab(s) by mouth every 8 hours  -- Indication: For Home med    hydroxychloroquine 200 mg oral tablet  -- 1 tab(s) by mouth once a day  -- Indication: For Home med    azaTHIOprine 50 mg oral tablet  -- 1 tab(s) by mouth once a day  -- Indication: For Home med    fluticasone 50 mcg/inh nasal spray  -- 1 spray(s) into nose 2 times a day  -- Indication: For Home med    pantoprazole 40 mg oral delayed release tablet  -- 1 tab(s) by mouth once a day (before a meal)  -- Indication: For Home med    folic acid 1 mg oral tablet  -- 1 tab(s) by mouth once a day  -- Indication: For Home med

## 2018-09-23 NOTE — ED ADULT NURSE REASSESSMENT NOTE - CONDITION
14fr indwelling sabillon cath placed under sterile technique without any complications. Output of 60cc of dark yellow urine noted.

## 2018-09-23 NOTE — BRIEF OPERATIVE NOTE - PROCEDURE
<<-----Click on this checkbox to enter Procedure Repair of laceration of vaginal wall  09/23/2018    Active  RSHIBATA

## 2018-09-23 NOTE — H&P ADULT - HISTORY OF PRESENT ILLNESS
after intercourse. Pt states that she had not had intercourse for many years. Today she started to have vaginal bleeding during intercourse.     She states that this was not a new partner. She declined STD screening. She states that it was voluntary intercourse.     Pt denies fever, chills, nausea, vomiting, diarrhea, headache, constipation, dizzyness, syncope, chest pain, palpitations, shortness of breath, dysuria, urgency, frequency. No abd vaginal discharge.     Pt is from [place] and is visiting a friend.       Primary OB/GYN:  OBH: c/s x2   GYNH: denies hx of fibroids, ov cysts, abnl paps, sti she had a TLH Bilateral Salpingectomy for menorrhagia. (1995)   PMSH: s/p c/s x2, s/p lap cholecystectomy s/p ESTEPHANIE, BS   MEDS: Coumadin, mtx (monthly injections)   ALL: pencillin (rash)   SOCH: denies t/e/d; safe at home  FAMH: denies fam hx of breast/uterine/ovarian/colon cancer  ROS: negative except per hpi after intercourse. Pt states that she had not had intercourse for many years. Today she started to have vaginal bleeding during intercourse. She stated that she had some blood in the toilet and soaked through 3pad since noon today.     She states that this was not a new partner. She declined STD screening. She states that it was voluntary intercourse.     Pt denies fever, chills, nausea, vomiting, diarrhea, headache, constipation, dizzyness, syncope, chest pain, palpitations, shortness of breath, dysuria, urgency, frequency. No abd vaginal discharge.     Pt is from [place] and is visiting a friend.       Primary OB/GYN:  OBH: c/s x2   GYNH: denies hx of fibroids, ov cysts, abnl paps, sti she had a TL Bilateral Salpingectomy for menorrhagia. (1995)   PMSH: s/p c/s x2, s/p lap cholecystectomy s/p ESTEPHANIE, BS   MEDS: Coumadin, mtx (monthly injections)   ALL: pencillin (rash)   SOCH: denies t/e/d; safe at home  FAMH: denies fam hx of breast/uterine/ovarian/colon cancer  ROS: negative except per hpi

## 2018-09-23 NOTE — PATIENT PROFILE ADULT. - FUNCTIONAL LEVEL PRIOR: DRESSING
[place] met with pt and wife Stephenie prior to d/c     [facility] to assist pt with payment for po meds     CPP to work out payment plan for pt - needs Ertapenem x 9 more days one gram iv daily.     Moe with CPP here to instruct pt and discuss administration/payment.  Pt will have to go to infusion suite for line care.      PICC inserted today     Future Appointments  Date Time Provider Department Center   1/4/2018 1:15 PM Annette Mathias MD [facility] TUMOR [facility]        12/28/17 6007   Final Note   Assessment Type Final Discharge Note   Discharge Disposition IV Therapy  (cpp )   What phone number can be called within the next 1-3 days to see how you are doing after discharge? [phone]   Hospital Follow Up  Appt(s) scheduled? Yes   Discharge plans and expectations educations in teach back method with documentation complete? Yes   Right Care Referral Info   Post Acute Recommendation Home-care   Referral Type (infusion )      (0) independent

## 2018-09-24 DIAGNOSIS — S31.41XA LACERATION WITHOUT FOREIGN BODY OF VAGINA AND VULVA, INITIAL ENCOUNTER: ICD-10-CM

## 2018-09-24 LAB
APTT BLD: 118.1 SEC — HIGH (ref 27.5–37.4)
APTT BLD: 84.7 SEC — HIGH (ref 27.5–37.4)
APTT BLD: 88.5 SEC — HIGH (ref 27.5–37.4)
APTT BLD: 99.8 SEC — HIGH (ref 27.5–37.4)
BUN SERPL-MCNC: 6 MG/DL — LOW (ref 7–23)
CALCIUM SERPL-MCNC: 8.1 MG/DL — LOW (ref 8.4–10.5)
CHLORIDE SERPL-SCNC: 105 MMOL/L — SIGNIFICANT CHANGE UP (ref 98–107)
CO2 SERPL-SCNC: 24 MMOL/L — SIGNIFICANT CHANGE UP (ref 22–31)
CREAT SERPL-MCNC: 0.69 MG/DL — SIGNIFICANT CHANGE UP (ref 0.5–1.3)
GLUCOSE SERPL-MCNC: 101 MG/DL — HIGH (ref 70–99)
HCT VFR BLD CALC: 27.3 % — LOW (ref 34.5–45)
HGB BLD-MCNC: 9.6 G/DL — LOW (ref 11.5–15.5)
MAGNESIUM SERPL-MCNC: 1.7 MG/DL — SIGNIFICANT CHANGE UP (ref 1.6–2.6)
MCHC RBC-ENTMCNC: 33.7 PG — SIGNIFICANT CHANGE UP (ref 27–34)
MCHC RBC-ENTMCNC: 35.2 % — SIGNIFICANT CHANGE UP (ref 32–36)
MCV RBC AUTO: 95.8 FL — SIGNIFICANT CHANGE UP (ref 80–100)
NRBC # FLD: 0 — SIGNIFICANT CHANGE UP
PHOSPHATE SERPL-MCNC: 2.7 MG/DL — SIGNIFICANT CHANGE UP (ref 2.5–4.5)
PLATELET # BLD AUTO: 186 K/UL — SIGNIFICANT CHANGE UP (ref 150–400)
PMV BLD: 10.1 FL — SIGNIFICANT CHANGE UP (ref 7–13)
POTASSIUM SERPL-MCNC: 3.1 MMOL/L — LOW (ref 3.5–5.3)
POTASSIUM SERPL-SCNC: 3.1 MMOL/L — LOW (ref 3.5–5.3)
RBC # BLD: 2.85 M/UL — LOW (ref 3.8–5.2)
RBC # FLD: 16.7 % — HIGH (ref 10.3–14.5)
SODIUM SERPL-SCNC: 140 MMOL/L — SIGNIFICANT CHANGE UP (ref 135–145)
WBC # BLD: 12.47 K/UL — HIGH (ref 3.8–10.5)
WBC # FLD AUTO: 12.47 K/UL — HIGH (ref 3.8–10.5)

## 2018-09-24 PROCEDURE — 99223 1ST HOSP IP/OBS HIGH 75: CPT | Mod: GC

## 2018-09-24 RX ORDER — WARFARIN SODIUM 2.5 MG/1
1 TABLET ORAL
Qty: 0 | Refills: 0 | COMMUNITY

## 2018-09-24 RX ORDER — ENOXAPARIN SODIUM 100 MG/ML
100 INJECTION SUBCUTANEOUS
Qty: 4 | Refills: 0 | OUTPATIENT
Start: 2018-09-24 | End: 2018-09-27

## 2018-09-24 RX ORDER — WARFARIN SODIUM 2.5 MG/1
7.5 TABLET ORAL DAILY
Qty: 0 | Refills: 0 | Status: DISCONTINUED | OUTPATIENT
Start: 2018-09-24 | End: 2018-09-24

## 2018-09-24 RX ORDER — FOLIC ACID 0.8 MG
1 TABLET ORAL
Qty: 0 | Refills: 0 | COMMUNITY
Start: 2018-09-24

## 2018-09-24 RX ORDER — PANTOPRAZOLE SODIUM 20 MG/1
1 TABLET, DELAYED RELEASE ORAL
Qty: 0 | Refills: 0 | COMMUNITY
Start: 2018-09-24

## 2018-09-24 RX ORDER — VENLAFAXINE HCL 75 MG
1 CAPSULE, EXT RELEASE 24 HR ORAL
Qty: 0 | Refills: 0 | COMMUNITY
Start: 2018-09-24

## 2018-09-24 RX ORDER — AZATHIOPRINE 100 MG/1
1 TABLET ORAL
Qty: 0 | Refills: 0 | COMMUNITY
Start: 2018-09-24

## 2018-09-24 RX ORDER — ENOXAPARIN SODIUM 100 MG/ML
70 INJECTION SUBCUTANEOUS
Qty: 0 | Refills: 0 | Status: DISCONTINUED | OUTPATIENT
Start: 2018-09-24 | End: 2018-09-25

## 2018-09-24 RX ORDER — ENOXAPARIN SODIUM 100 MG/ML
70 INJECTION SUBCUTANEOUS
Qty: 980 | Refills: 0 | OUTPATIENT
Start: 2018-09-24 | End: 2018-09-30

## 2018-09-24 RX ORDER — FLUTICASONE PROPIONATE 50 MCG
1 SPRAY, SUSPENSION NASAL
Qty: 0 | Refills: 0 | COMMUNITY
Start: 2018-09-24

## 2018-09-24 RX ORDER — LEVETIRACETAM 250 MG/1
1 TABLET, FILM COATED ORAL
Qty: 0 | Refills: 0 | COMMUNITY
Start: 2018-09-24

## 2018-09-24 RX ORDER — HYDROXYCHLOROQUINE SULFATE 200 MG
1 TABLET ORAL
Qty: 0 | Refills: 0 | COMMUNITY
Start: 2018-09-24

## 2018-09-24 RX ORDER — ENOXAPARIN SODIUM 100 MG/ML
70 INJECTION SUBCUTANEOUS
Qty: 10 | Refills: 0 | OUTPATIENT
Start: 2018-09-24 | End: 2018-09-28

## 2018-09-24 RX ORDER — WARFARIN SODIUM 2.5 MG/1
7.5 TABLET ORAL ONCE
Qty: 0 | Refills: 0 | Status: DISCONTINUED | OUTPATIENT
Start: 2018-09-24 | End: 2018-09-25

## 2018-09-24 RX ADMIN — Medication 75 MILLIGRAM(S): at 14:33

## 2018-09-24 RX ADMIN — LEVETIRACETAM 750 MILLIGRAM(S): 250 TABLET, FILM COATED ORAL at 21:48

## 2018-09-24 RX ADMIN — SODIUM CHLORIDE 125 MILLILITER(S): 9 INJECTION, SOLUTION INTRAVENOUS at 06:55

## 2018-09-24 RX ADMIN — Medication 75 MILLIGRAM(S): at 05:57

## 2018-09-24 RX ADMIN — HEPARIN SODIUM 1100 UNIT(S)/HR: 5000 INJECTION INTRAVENOUS; SUBCUTANEOUS at 11:03

## 2018-09-24 RX ADMIN — SODIUM CHLORIDE 125 MILLILITER(S): 9 INJECTION, SOLUTION INTRAVENOUS at 21:48

## 2018-09-24 RX ADMIN — Medication 1 MILLIGRAM(S): at 06:54

## 2018-09-24 RX ADMIN — Medication 1 MILLIGRAM(S): at 12:17

## 2018-09-24 RX ADMIN — Medication 325 MILLIGRAM(S): at 12:17

## 2018-09-24 RX ADMIN — Medication 1 SPRAY(S): at 05:56

## 2018-09-24 RX ADMIN — Medication 975 MILLIGRAM(S): at 02:00

## 2018-09-24 RX ADMIN — PANTOPRAZOLE SODIUM 40 MILLIGRAM(S): 20 TABLET, DELAYED RELEASE ORAL at 05:57

## 2018-09-24 RX ADMIN — Medication 75 MILLIGRAM(S): at 21:48

## 2018-09-24 RX ADMIN — Medication 200 MILLIGRAM(S): at 12:17

## 2018-09-24 RX ADMIN — Medication 975 MILLIGRAM(S): at 01:20

## 2018-09-24 RX ADMIN — ENOXAPARIN SODIUM 70 MILLIGRAM(S): 100 INJECTION SUBCUTANEOUS at 21:47

## 2018-09-24 RX ADMIN — HEPARIN SODIUM 1200 UNIT(S)/HR: 5000 INJECTION INTRAVENOUS; SUBCUTANEOUS at 03:48

## 2018-09-24 NOTE — PROGRESS NOTE ADULT - SUBJECTIVE AND OBJECTIVE BOX
Patient seen and examined at bedside, no acute overnight events. No acute complaints, pain well controlled. Patient is ambulating, passing flatus, voiding spontaneously, and tolerating regular diet. Denies CP, SOB, N/V, fevers, and chills.    Vital Signs Last 24 Hours  T(C): 36.8 (09-24-18 @ 05:47), Max: 37.1 (09-23-18 @ 09:55)  HR: 73 (09-24-18 @ 05:47) (73 - 98)  BP: 131/59 (09-24-18 @ 05:47) (108/42 - 147/53)  RR: 16 (09-24-18 @ 05:47) (16 - 18)  SpO2: 100% (09-24-18 @ 05:47) (80% - 100%)    I&O's Detail    23 Sep 2018 07:01  -  24 Sep 2018 07:00  --------------------------------------------------------  IN:    heparin  Infusion.: 60 mL    lactated ringers.: 75 mL    lactated ringers.: 712 mL    Oral Fluid: 120 mL    Packed Red Blood Cells: 610 mL  Total IN: 1577 mL    OUT:    Indwelling Catheter - Urethral: 1030 mL    Voided: 1400 mL  Total OUT: 2430 mL    Total NET: -853 mL    Physical Exam:  General: NAD  CV: NR, RR, S1, S2, no M/R/G  Lungs: CTA-B  Abdomen: Soft, non-tender, non-distended, +BS  Ext: No pain or swelling    Labs:             9.6<L>  12.47<H> )-----------( 186      ( 09-24 @ 02:50 )             27.3<L>               9.8<L>  12.81<H> )-----------( 213      ( 09-23 @ 22:50 )             29.3<L>               6.9<LL>  13.57<H> )-----------( 228      ( 09-23 @ 06:40 )             20.7<LL>               7.8<L>  15.64<H> )-----------( 243      ( 09-23 @ 03:15 )             23.1<L>               8.7<L>  17.20<H> )-----------( 267      ( 09-22 @ 23:45 )             26.6<L>        MEDICATIONS  (STANDING):  azaTHIOprine 50 milliGRAM(s) Oral daily  ferrous    sulfate 325 milliGRAM(s) Oral daily  fluticasone propionate 50 MICROgram(s)/spray Nasal Spray 1 Spray(s) Both Nostrils two times a day  folic acid 1 milliGRAM(s) Oral daily  heparin  Infusion.  Unit(s)/Hr (12 mL/Hr) IV Continuous <Continuous>  hydroxychloroquine 200 milliGRAM(s) Oral daily  lactated ringers. 1000 milliLiter(s) (125 mL/Hr) IV Continuous <Continuous>  levETIRAcetam 750 milliGRAM(s) Oral two times a day  pantoprazole    Tablet 40 milliGRAM(s) Oral before breakfast  predniSONE   Tablet 1 milliGRAM(s) Oral daily  venlafaxine 75 milliGRAM(s) Oral every 8 hours    MEDICATIONS  (PRN):  acetaminophen   Tablet .. 975 milliGRAM(s) Oral every 6 hours PRN Mild Pain (1 - 3)  heparin  Injectable 5500 Unit(s) IV Push every 6 hours PRN For aPTT less than 40  heparin  Injectable 2500 Unit(s) IV Push every 6 hours PRN For aPTT between 40 - 57  ibuprofen  Tablet. 600 milliGRAM(s) Oral every 6 hours PRN Mild Pain (1 - 3)

## 2018-09-24 NOTE — CONSULT NOTE ADULT - SUBJECTIVE AND OBJECTIVE BOX
HPI:  58F Factor V Leiden, SLE with APLS by serology has been on warfarin (7.5mg 6 days a week and 5mg one day) was admitted with vaginal bleeding secondary laceration now resolved after GYN intervention, hematology consulted for anticoagulation recommendation.    Primary OB/GYN:  OBH: c/s x2   GYNH: denies hx of fibroids, ov cysts, abnl paps, sti she had a TLH Bilateral Salpingectomy for menorrhagia. (1995)   PMSH: s/p c/s x2, s/p lap cholecystectomy s/p ESTEPHANIE, BS   MEDS: Coumadin, mtx (monthly injections)   ALL: pencillin (rash)   SOCH: denies t/e/d; safe at home  FAMH: denies fam hx of breast/uterine/ovarian/colon cancer  ROS: negative except per hpi (23 Sep 2018 00:07)    Allergies    No Known Allergies    Intolerances        MEDICATIONS  (STANDING):  azaTHIOprine 50 milliGRAM(s) Oral daily  ferrous    sulfate 325 milliGRAM(s) Oral daily  fluticasone propionate 50 MICROgram(s)/spray Nasal Spray 1 Spray(s) Both Nostrils two times a day  folic acid 1 milliGRAM(s) Oral daily  heparin  Infusion.  Unit(s)/Hr (12 mL/Hr) IV Continuous <Continuous>  hydroxychloroquine 200 milliGRAM(s) Oral daily  lactated ringers. 1000 milliLiter(s) (125 mL/Hr) IV Continuous <Continuous>  levETIRAcetam 750 milliGRAM(s) Oral two times a day  pantoprazole    Tablet 40 milliGRAM(s) Oral before breakfast  predniSONE   Tablet 1 milliGRAM(s) Oral daily  venlafaxine 75 milliGRAM(s) Oral every 8 hours    MEDICATIONS  (PRN):  acetaminophen   Tablet .. 975 milliGRAM(s) Oral every 6 hours PRN Mild Pain (1 - 3)  heparin  Injectable 5500 Unit(s) IV Push every 6 hours PRN For aPTT less than 40  heparin  Injectable 2500 Unit(s) IV Push every 6 hours PRN For aPTT between 40 - 57  ibuprofen  Tablet. 600 milliGRAM(s) Oral every 6 hours PRN Mild Pain (1 - 3)      PAST MEDICAL & SURGICAL HISTORY:  Factor V Leiden  Antiphospholipid antibody syndrome  Lupus  S/P cholecystectomy  H/O of hysterectomy with bilateral oophorectomy      FAMILY HISTORY:  No pertinent family history in first degree relatives      SOCIAL HISTORY: No EtOH, no tobacco    REVIEW OF SYSTEMS:    CONSTITUTIONAL: No weakness, fevers or chills  EYES/ENT: No visual changes;  No vertigo or throat pain   NECK: No pain or stiffness  RESPIRATORY: No cough, wheezing, hemoptysis; No shortness of breath  CARDIOVASCULAR: No chest pain or palpitations  GASTROINTESTINAL: No abdominal or epigastric pain. No nausea, vomiting, or hematemesis; No diarrhea or constipation. No melena or hematochezia.  GENITOURINARY: No dysuria, frequency or hematuria  NEUROLOGICAL: No numbness or weakness  SKIN: No itching, burning, rashes, or lesions   All other review of systems is negative unless indicated above.    Height (cm): 160.02 (09-23 @ 20:21)  Weight (kg): 68.492 (09-23 @ 20:23)  BMI (kg/m2): 26.7 (09-23 @ 20:23)  BSA (m2): 1.72 (09-23 @ 20:23)    T(F): 98 (09-24-18 @ 17:35), Max: 99.1 (09-24-18 @ 14:30)  HR: 86 (09-24-18 @ 17:35)  BP: 140/66 (09-24-18 @ 17:35)  RR: 17 (09-24-18 @ 17:35)  SpO2: 97% (09-24-18 @ 17:35)  Wt(kg): --    GENERAL: NAD, well-developed  HEAD:  Atraumatic, Normocephalic  EYES: EOMI, PERRLA, conjunctiva and sclera clear  NECK: Supple, No JVD  CHEST/LUNG: Clear to auscultation bilaterally; No wheeze  HEART: Regular rate and rhythm; No murmurs, rubs, or gallops  ABDOMEN: Soft, Nontender, Nondistended; Bowel sounds present  EXTREMITIES:  2+ Peripheral Pulses, No clubbing, cyanosis, or edema  NEUROLOGY: non-focal  SKIN: No rashes or lesions                          9.6    12.47 )-----------( 186      ( 24 Sep 2018 02:50 )             27.3       09-24    140  |  105  |  6<L>  ----------------------------<  101<H>  3.1<L>   |  24  |  0.69    Ca    8.1<L>      24 Sep 2018 02:50  Phos  2.7     09-24  Mg     1.7     09-24    TPro  5.6<L>  /  Alb  3.4  /  TBili  0.5  /  DBili  x   /  AST  13  /  ALT  8   /  AlkPhos  74  09-22      Phosphorus Level, Serum: 2.7 mg/dL (09-24 @ 02:50)  Magnesium, Serum: 1.7 mg/dL (09-24 @ 02:50) HPI:  58F Factor V Leiden, SLE with APLS by serology has been on warfarin (7.5mg 6 days a week and 5mg one day) was admitted with vaginal bleeding secondary laceration now resolved after GYN intervention, hematology consulted for anticoagulation recommendation.    Patient states she feels well, denies lower extremity swelling, chest pain or shortness of breath.    Allergie  No Known Allergies    MEDICATIONS  (STANDING):  azaTHIOprine 50 milliGRAM(s) Oral daily  ferrous    sulfate 325 milliGRAM(s) Oral daily  fluticasone propionate 50 MICROgram(s)/spray Nasal Spray 1 Spray(s) Both Nostrils two times a day  folic acid 1 milliGRAM(s) Oral daily  heparin  Infusion.  Unit(s)/Hr (12 mL/Hr) IV Continuous <Continuous>  hydroxychloroquine 200 milliGRAM(s) Oral daily  lactated ringers. 1000 milliLiter(s) (125 mL/Hr) IV Continuous <Continuous>  levETIRAcetam 750 milliGRAM(s) Oral two times a day  pantoprazole    Tablet 40 milliGRAM(s) Oral before breakfast  predniSONE   Tablet 1 milliGRAM(s) Oral daily  venlafaxine 75 milliGRAM(s) Oral every 8 hours    MEDICATIONS  (PRN):  acetaminophen   Tablet .. 975 milliGRAM(s) Oral every 6 hours PRN Mild Pain (1 - 3)  heparin  Injectable 5500 Unit(s) IV Push every 6 hours PRN For aPTT less than 40  heparin  Injectable 2500 Unit(s) IV Push every 6 hours PRN For aPTT between 40 - 57  ibuprofen  Tablet. 600 milliGRAM(s) Oral every 6 hours PRN Mild Pain (1 - 3)    PAST MEDICAL & SURGICAL HISTORY:  Factor V Leiden  Antiphospholipid antibody syndrome  Lupus  S/P cholecystectomy  H/O of hysterectomy with bilateral oophorectomy    FAMILY HISTORY:  Mother- Factor V Leiden     SOCIAL HISTORY: No EtOH, no tobacco    REVIEW OF SYSTEMS:  10 point ROS otherwise negative    Height (cm): 160.02 (09-23 @ 20:21)  Weight (kg): 68.492 (09-23 @ 20:23)  BMI (kg/m2): 26.7 (09-23 @ 20:23)  BSA (m2): 1.72 (09-23 @ 20:23)    T(F): 98 (09-24-18 @ 17:35), Max: 99.1 (09-24-18 @ 14:30)  HR: 86 (09-24-18 @ 17:35)  BP: 140/66 (09-24-18 @ 17:35)  RR: 17 (09-24-18 @ 17:35)  SpO2: 97% (09-24-18 @ 17:35)  Wt(kg): --    GENERAL: NAD, well-developed  HEAD:  Atraumatic, Normocephalic  EYES: EOMI, PERRLA, conjunctiva and sclera clear  NECK: Supple, No JVD  CHEST/LUNG: Clear to auscultation bilaterally; No wheeze  HEART: Regular rate and rhythm; No murmurs, rubs, or gallops  ABDOMEN: Soft, Nontender, Nondistended; Bowel sounds present  EXTREMITIES:  2+ Peripheral Pulses, No clubbing, cyanosis, or edema  NEUROLOGY: non-focal  SKIN: No rashes or lesions                          9.6    12.47 )-----------( 186      ( 24 Sep 2018 02:50 )             27.3       09-24    140  |  105  |  6<L>  ----------------------------<  101<H>  3.1<L>   |  24  |  0.69    Ca    8.1<L>      24 Sep 2018 02:50  Phos  2.7     09-24  Mg     1.7     09-24    TPro  5.6<L>  /  Alb  3.4  /  TBili  0.5  /  DBili  x   /  AST  13  /  ALT  8   /  AlkPhos  74  09-22      Phosphorus Level, Serum: 2.7 mg/dL (09-24 @ 02:50)  Magnesium, Serum: 1.7 mg/dL (09-24 @ 02:50)  Magnesium, Serum: 1.7 mg/dL (09-24 @ 02:50)

## 2018-09-24 NOTE — PROGRESS NOTE ADULT - PROBLEM SELECTOR PLAN 1
Neuro: c/w po pain meds  CV: Hemodynamically stable, CBC pending this AM  Pulm: Saturating well on room air, encourage incentive spirometry  GI: c/w regular diet  : voiding spontaneously  Heme: c/w therapeutic heparin, plan to bridge to coumadin today pending hematology evaluation  Dispo: Continue routine post-op care, likely d/c once bridged to coumidin    Jason Chandler, PGY-3  Pager #[phone] ([facility]), [phone] ([place])

## 2018-09-24 NOTE — CONSULT NOTE ADULT - ASSESSMENT
57yo  s/p TLH BS () pmh significant SLE, APLS on coumadin with vaginal bleeding after intercourse. VB 2/2 2 3cm vaginal laceration. Intact vaginal cuff. Pt is HD stable.
58F Factor V Leiden, SLE with APLS by serology has been on warfarin (7.5mg 6 days a week and 5mg one day) was admitted with vaginal bleeding secondary laceration now resolved after GYN intervention, hematology consulted for anticoagulation recommendation.    1. Anticoagulation in patient with hypercoaguable state  -nidaetn with known history of Factor V Leiden and APLS, has been told she suffered a small stroke in past and has been on anticoagulation since with warfarin  -appreciate GYN care, patient is s/p vaginal laceration repair  -recommend bridging to warfarin, may bridge with either heparin drip or enoxaparin, preference for enoxaparin would be 1mg/kg bid and would start warfarin 7.5mg daily, would stop heparin one hour prior to starting enoxaparin. Please have staff teach patient how to self inject prior to discharge and please check with pharmacy that a 7 day supply will be affordable. Discussed need for short interval CBC and INR check with patient and she states she will follow with her PMD and hematologist by Friday. There is no hematology objection to patient flying back home as long as she takes anticoagulation as prescribed.    Please call hematology fellow if any questions or concerns    Alec Pacheco  PGY-6, Hematology-Oncology Fellow  [phone] ([place]) [phone] ([facility])

## 2018-09-24 NOTE — PROGRESS NOTE ADULT - SUBJECTIVE AND OBJECTIVE BOX
ANESTHESIA POSTOP CHECK    58y Female POSTOP DAY 1      Vital Signs Last 24 Hrs  T(C): 36.8 (24 Sep 2018 05:47), Max: 37.1 (23 Sep 2018 09:55)  T(F): 98.2 (24 Sep 2018 05:47), Max: 98.8 (23 Sep 2018 09:55)  HR: 73 (24 Sep 2018 05:47) (73 - 98)  BP: 131/59 (24 Sep 2018 05:47) (108/42 - 147/53)  BP(mean): 58 (23 Sep 2018 09:00) (58 - 58)  RR: 16 (24 Sep 2018 05:47) (16 - 18)  SpO2: 100% (24 Sep 2018 05:47) (80% - 100%)  I&O's Summary    23 Sep 2018 07:01  -  24 Sep 2018 07:00  --------------------------------------------------------  IN: 2025 mL / OUT: 2830 mL / NET: -805 mL        [X ] NO APPARENT ANESTHESIA COMPLICATIONS      Comments:

## 2018-09-24 NOTE — PROGRESS NOTE ADULT - ATTENDING COMMENTS
I have examined pt and rev her care. No further vaginal bleeding.Hematology consulted to assess if brifge to Coumadin may be done from Lovenox rather than Heparin since pt having no further vag bleeding in the last 24 hrs

## 2018-09-25 VITALS
HEART RATE: 71 BPM | SYSTOLIC BLOOD PRESSURE: 137 MMHG | TEMPERATURE: 98 F | DIASTOLIC BLOOD PRESSURE: 52 MMHG | RESPIRATION RATE: 18 BRPM | OXYGEN SATURATION: 100 %

## 2018-09-25 LAB
BASOPHILS # BLD AUTO: 0.04 K/UL — SIGNIFICANT CHANGE UP (ref 0–0.2)
BASOPHILS NFR BLD AUTO: 0.4 % — SIGNIFICANT CHANGE UP (ref 0–2)
BUN SERPL-MCNC: 4 MG/DL — LOW (ref 7–23)
BUN SERPL-MCNC: 5 MG/DL — LOW (ref 7–23)
CALCIUM SERPL-MCNC: 8.5 MG/DL — SIGNIFICANT CHANGE UP (ref 8.4–10.5)
CALCIUM SERPL-MCNC: 9.1 MG/DL — SIGNIFICANT CHANGE UP (ref 8.4–10.5)
CHLORIDE SERPL-SCNC: 102 MMOL/L — SIGNIFICANT CHANGE UP (ref 98–107)
CHLORIDE SERPL-SCNC: 103 MMOL/L — SIGNIFICANT CHANGE UP (ref 98–107)
CO2 SERPL-SCNC: 27 MMOL/L — SIGNIFICANT CHANGE UP (ref 22–31)
CO2 SERPL-SCNC: 27 MMOL/L — SIGNIFICANT CHANGE UP (ref 22–31)
CREAT SERPL-MCNC: 0.67 MG/DL — SIGNIFICANT CHANGE UP (ref 0.5–1.3)
CREAT SERPL-MCNC: 0.69 MG/DL — SIGNIFICANT CHANGE UP (ref 0.5–1.3)
EOSINOPHIL # BLD AUTO: 0.14 K/UL — SIGNIFICANT CHANGE UP (ref 0–0.5)
EOSINOPHIL NFR BLD AUTO: 1.5 % — SIGNIFICANT CHANGE UP (ref 0–6)
GLUCOSE SERPL-MCNC: 122 MG/DL — HIGH (ref 70–99)
GLUCOSE SERPL-MCNC: 86 MG/DL — SIGNIFICANT CHANGE UP (ref 70–99)
HCT VFR BLD CALC: 26.6 % — LOW (ref 34.5–45)
HGB BLD-MCNC: 9 G/DL — LOW (ref 11.5–15.5)
IMM GRANULOCYTES # BLD AUTO: 0.08 # — SIGNIFICANT CHANGE UP
IMM GRANULOCYTES NFR BLD AUTO: 0.8 % — SIGNIFICANT CHANGE UP (ref 0–1.5)
LYMPHOCYTES # BLD AUTO: 2.27 K/UL — SIGNIFICANT CHANGE UP (ref 1–3.3)
LYMPHOCYTES # BLD AUTO: 23.9 % — SIGNIFICANT CHANGE UP (ref 13–44)
MAGNESIUM SERPL-MCNC: 1.6 MG/DL — SIGNIFICANT CHANGE UP (ref 1.6–2.6)
MAGNESIUM SERPL-MCNC: 1.7 MG/DL — SIGNIFICANT CHANGE UP (ref 1.6–2.6)
MCHC RBC-ENTMCNC: 32.7 PG — SIGNIFICANT CHANGE UP (ref 27–34)
MCHC RBC-ENTMCNC: 33.8 % — SIGNIFICANT CHANGE UP (ref 32–36)
MCV RBC AUTO: 96.7 FL — SIGNIFICANT CHANGE UP (ref 80–100)
MONOCYTES # BLD AUTO: 1.16 K/UL — HIGH (ref 0–0.9)
MONOCYTES NFR BLD AUTO: 12.2 % — SIGNIFICANT CHANGE UP (ref 2–14)
NEUTROPHILS # BLD AUTO: 5.79 K/UL — SIGNIFICANT CHANGE UP (ref 1.8–7.4)
NEUTROPHILS NFR BLD AUTO: 61.2 % — SIGNIFICANT CHANGE UP (ref 43–77)
NRBC # FLD: 0 — SIGNIFICANT CHANGE UP
PHOSPHATE SERPL-MCNC: 2.4 MG/DL — LOW (ref 2.5–4.5)
PHOSPHATE SERPL-MCNC: 3 MG/DL — SIGNIFICANT CHANGE UP (ref 2.5–4.5)
PLATELET # BLD AUTO: 208 K/UL — SIGNIFICANT CHANGE UP (ref 150–400)
PMV BLD: 10.4 FL — SIGNIFICANT CHANGE UP (ref 7–13)
POTASSIUM SERPL-MCNC: 2.6 MMOL/L — CRITICAL LOW (ref 3.5–5.3)
POTASSIUM SERPL-MCNC: 4 MMOL/L — SIGNIFICANT CHANGE UP (ref 3.5–5.3)
POTASSIUM SERPL-SCNC: 2.6 MMOL/L — CRITICAL LOW (ref 3.5–5.3)
POTASSIUM SERPL-SCNC: 4 MMOL/L — SIGNIFICANT CHANGE UP (ref 3.5–5.3)
RBC # BLD: 2.75 M/UL — LOW (ref 3.8–5.2)
RBC # FLD: 15.9 % — HIGH (ref 10.3–14.5)
SODIUM SERPL-SCNC: 140 MMOL/L — SIGNIFICANT CHANGE UP (ref 135–145)
SODIUM SERPL-SCNC: 142 MMOL/L — SIGNIFICANT CHANGE UP (ref 135–145)
WBC # BLD: 9.48 K/UL — SIGNIFICANT CHANGE UP (ref 3.8–10.5)
WBC # FLD AUTO: 9.48 K/UL — SIGNIFICANT CHANGE UP (ref 3.8–10.5)

## 2018-09-25 RX ORDER — WARFARIN SODIUM 2.5 MG/1
7.5 TABLET ORAL ONCE
Qty: 0 | Refills: 0 | Status: COMPLETED | OUTPATIENT
Start: 2018-09-25 | End: 2018-09-25

## 2018-09-25 RX ORDER — POTASSIUM CHLORIDE 20 MEQ
40 PACKET (EA) ORAL EVERY 4 HOURS
Qty: 0 | Refills: 0 | Status: COMPLETED | OUTPATIENT
Start: 2018-09-25 | End: 2018-09-25

## 2018-09-25 RX ORDER — WARFARIN SODIUM 2.5 MG/1
7.5 TABLET ORAL DAILY
Qty: 0 | Refills: 0 | Status: DISCONTINUED | OUTPATIENT
Start: 2018-09-25 | End: 2018-09-25

## 2018-09-25 RX ORDER — WARFARIN SODIUM 2.5 MG/1
1 TABLET ORAL
Qty: 0 | Refills: 0 | COMMUNITY

## 2018-09-25 RX ORDER — POTASSIUM CHLORIDE 20 MEQ
10 PACKET (EA) ORAL
Qty: 0 | Refills: 0 | Status: COMPLETED | OUTPATIENT
Start: 2018-09-25 | End: 2018-09-25

## 2018-09-25 RX ORDER — ENOXAPARIN SODIUM 100 MG/ML
70 INJECTION SUBCUTANEOUS
Qty: 14 | Refills: 0 | OUTPATIENT
Start: 2018-09-25 | End: 2018-10-01

## 2018-09-25 RX ORDER — WARFARIN SODIUM 2.5 MG/1
1 TABLET ORAL
Qty: 14 | Refills: 0 | OUTPATIENT
Start: 2018-09-25

## 2018-09-25 RX ADMIN — Medication 75 MILLIGRAM(S): at 12:59

## 2018-09-25 RX ADMIN — WARFARIN SODIUM 7.5 MILLIGRAM(S): 2.5 TABLET ORAL at 16:56

## 2018-09-25 RX ADMIN — Medication 40 MILLIEQUIVALENT(S): at 12:55

## 2018-09-25 RX ADMIN — Medication 100 MILLIEQUIVALENT(S): at 08:25

## 2018-09-25 RX ADMIN — ENOXAPARIN SODIUM 70 MILLIGRAM(S): 100 INJECTION SUBCUTANEOUS at 16:55

## 2018-09-25 RX ADMIN — SODIUM CHLORIDE 125 MILLILITER(S): 9 INJECTION, SOLUTION INTRAVENOUS at 08:25

## 2018-09-25 RX ADMIN — Medication 75 MILLIGRAM(S): at 06:11

## 2018-09-25 RX ADMIN — Medication 1 MILLIGRAM(S): at 06:12

## 2018-09-25 RX ADMIN — Medication 40 MILLIEQUIVALENT(S): at 16:56

## 2018-09-25 RX ADMIN — Medication 200 MILLIGRAM(S): at 12:58

## 2018-09-25 RX ADMIN — Medication 100 MILLIEQUIVALENT(S): at 12:51

## 2018-09-25 RX ADMIN — ENOXAPARIN SODIUM 70 MILLIGRAM(S): 100 INJECTION SUBCUTANEOUS at 06:10

## 2018-09-25 RX ADMIN — Medication 1 MILLIGRAM(S): at 12:57

## 2018-09-25 RX ADMIN — Medication 40 MILLIEQUIVALENT(S): at 10:33

## 2018-09-25 RX ADMIN — Medication 325 MILLIGRAM(S): at 12:56

## 2018-09-25 RX ADMIN — SODIUM CHLORIDE 125 MILLILITER(S): 9 INJECTION, SOLUTION INTRAVENOUS at 10:34

## 2018-09-25 RX ADMIN — Medication 100 MILLIEQUIVALENT(S): at 10:32

## 2018-09-25 RX ADMIN — PANTOPRAZOLE SODIUM 40 MILLIGRAM(S): 20 TABLET, DELAYED RELEASE ORAL at 06:11

## 2018-09-25 RX ADMIN — Medication 1 SPRAY(S): at 06:10

## 2018-09-25 NOTE — PROGRESS NOTE ADULT - SUBJECTIVE AND OBJECTIVE BOX
Patient seen and examined at bedside, no acute overnight events. No acute complaints, pain well controlled. Patient is ambulating, passing flatus, voiding spontaneously, and tolerating regular diet. Denies CP, SOB, N/V, fevers, and chills.    Vital Signs Last 24 Hours  T(C): 36.8 (09-25-18 @ 01:54), Max: 37.3 (09-24-18 @ 14:30)  HR: 76 (09-25-18 @ 01:54) (76 - 86)  BP: 133/65 (09-25-18 @ 01:54) (114/73 - 140/66)  RR: 17 (09-25-18 @ 01:54) (16 - 17)  SpO2: 98% (09-25-18 @ 01:54) (97% - 99%)    I&O's Detail    23 Sep 2018 07:01  -  24 Sep 2018 07:00  --------------------------------------------------------  IN:    heparin  Infusion.: 108 mL    lactated ringers.: 75 mL    lactated ringers.: 1112 mL    Oral Fluid: 120 mL    Packed Red Blood Cells: 610 mL  Total IN: 2025 mL    OUT:    Indwelling Catheter - Urethral: 1030 mL    Voided: 1800 mL  Total OUT: 2830 mL    Total NET: -805 mL      24 Sep 2018 07:01  -  25 Sep 2018 06:32  --------------------------------------------------------  IN:    heparin  Infusion.: 136 mL    lactated ringers.: 2500 mL    Oral Fluid: 250 mL  Total IN: 2886 mL    OUT:    Voided: 1500 mL  Total OUT: 1500 mL    Total NET: 1386 mL      Physical Exam:  General: NAD  CV: NR, RR, S1, S2, no M/R/G  Lungs: CTA-B  Abdomen: Soft, non-tender, non-distended, +BS  : No blood on pad  Ext: No pain or swelling    Labs:             9.6<L>  12.47<H> )-----------( 186      ( 09-24 @ 02:50 )             27.3<L>               9.8<L>  12.81<H> )-----------( 213      ( 09-23 @ 22:50 )             29.3<L>               6.9<LL>  13.57<H> )-----------( 228      ( 09-23 @ 06:40 )             20.7<LL>               7.8<L>  15.64<H> )-----------( 243      ( 09-23 @ 03:15 )             23.1<L>               8.7<L>  17.20<H> )-----------( 267      ( 09-22 @ 23:45 )             26.6<L>        MEDICATIONS  (STANDING):  azaTHIOprine 50 milliGRAM(s) Oral daily  enoxaparin Injectable 70 milliGRAM(s) SubCutaneous two times a day  ferrous    sulfate 325 milliGRAM(s) Oral daily  fluticasone propionate 50 MICROgram(s)/spray Nasal Spray 1 Spray(s) Both Nostrils two times a day  folic acid 1 milliGRAM(s) Oral daily  hydroxychloroquine 200 milliGRAM(s) Oral daily  lactated ringers. 1000 milliLiter(s) (125 mL/Hr) IV Continuous <Continuous>  levETIRAcetam 750 milliGRAM(s) Oral two times a day  pantoprazole    Tablet 40 milliGRAM(s) Oral before breakfast  predniSONE   Tablet 1 milliGRAM(s) Oral daily  venlafaxine 75 milliGRAM(s) Oral every 8 hours  warfarin 7.5 milliGRAM(s) Oral once    MEDICATIONS  (PRN):  acetaminophen   Tablet .. 975 milliGRAM(s) Oral every 6 hours PRN Mild Pain (1 - 3)  ibuprofen  Tablet. 600 milliGRAM(s) Oral every 6 hours PRN Mild Pain (1 - 3)

## 2018-09-25 NOTE — PROGRESS NOTE ADULT - PROBLEM SELECTOR PLAN 1
Neuro: c/w po pain meds  CV: Hemodynamically stable, CBC pending this AM  Pulm: Saturating well on room air, encourage incentive spirometry  GI: c/w regular diet  : voiding spontaneously  Heme: c/w therapeutic Lovenox and coumadin. Plan to d/c home today with Lovenox and coumadin so pt my continue bridging outpatient. Pt plans on seeing her PMD this week for INR check.  Dispo: D/c Home    Jason Chandler, PGY-3  Pager #[phone] ([facility]), [phone] ([place])

## 2018-09-25 NOTE — PROVIDER CONTACT NOTE (CRITICAL VALUE NOTIFICATION) - ASSESSMENT
No s/s acute distress, no c/o pain, no s/s cardiac abnormalities upon gross assessment.
pt AxOx4; no bleeding noted on peripad, pt denies dizziness, palpitations and dyspnea. pt Vitalsigns stable.

## 2018-09-25 NOTE — PROGRESS NOTE ADULT - ASSESSMENT
57y/o POD#2 from vaginal laceration repair in stable condition. 57y/o POD#2 from vaginal laceration repair in stable condition.  Pt potassium low--need replacement prior to DC
